# Patient Record
Sex: FEMALE | Race: WHITE | Employment: OTHER | URBAN - METROPOLITAN AREA
[De-identification: names, ages, dates, MRNs, and addresses within clinical notes are randomized per-mention and may not be internally consistent; named-entity substitution may affect disease eponyms.]

---

## 2017-01-01 ENCOUNTER — APPOINTMENT (OUTPATIENT)
Dept: CT IMAGING | Facility: HOSPITAL | Age: 72
DRG: 280 | End: 2017-01-01
Attending: EMERGENCY MEDICINE
Payer: MEDICARE

## 2017-01-01 ENCOUNTER — LAB REQUISITION (OUTPATIENT)
Dept: LAB | Facility: HOSPITAL | Age: 72
End: 2017-01-01
Attending: FAMILY MEDICINE
Payer: MEDICARE

## 2017-01-01 ENCOUNTER — APPOINTMENT (OUTPATIENT)
Dept: GENERAL RADIOLOGY | Facility: HOSPITAL | Age: 72
DRG: 291 | End: 2017-01-01
Attending: EMERGENCY MEDICINE
Payer: MEDICARE

## 2017-01-01 ENCOUNTER — APPOINTMENT (OUTPATIENT)
Dept: CV DIAGNOSTICS | Facility: HOSPITAL | Age: 72
DRG: 280 | End: 2017-01-01
Attending: NURSE PRACTITIONER
Payer: MEDICARE

## 2017-01-01 ENCOUNTER — HOSPITAL ENCOUNTER (INPATIENT)
Facility: HOSPITAL | Age: 72
LOS: 1 days | DRG: 291 | End: 2017-01-01
Attending: EMERGENCY MEDICINE | Admitting: FAMILY MEDICINE
Payer: MEDICARE

## 2017-01-01 ENCOUNTER — PRIOR ORIGINAL RECORDS (OUTPATIENT)
Dept: OTHER | Age: 72
End: 2017-01-01

## 2017-01-01 ENCOUNTER — APPOINTMENT (OUTPATIENT)
Dept: GENERAL RADIOLOGY | Facility: HOSPITAL | Age: 72
DRG: 280 | End: 2017-01-01
Attending: EMERGENCY MEDICINE
Payer: MEDICARE

## 2017-01-01 ENCOUNTER — APPOINTMENT (OUTPATIENT)
Dept: GENERAL RADIOLOGY | Facility: HOSPITAL | Age: 72
DRG: 280 | End: 2017-01-01
Attending: INTERNAL MEDICINE
Payer: MEDICARE

## 2017-01-01 ENCOUNTER — APPOINTMENT (OUTPATIENT)
Dept: GENERAL RADIOLOGY | Facility: HOSPITAL | Age: 72
DRG: 280 | End: 2017-01-01
Attending: NURSE PRACTITIONER
Payer: MEDICARE

## 2017-01-01 ENCOUNTER — APPOINTMENT (OUTPATIENT)
Dept: ULTRASOUND IMAGING | Facility: HOSPITAL | Age: 72
DRG: 280 | End: 2017-01-01
Attending: INTERNAL MEDICINE
Payer: MEDICARE

## 2017-01-01 ENCOUNTER — HOSPITAL ENCOUNTER (INPATIENT)
Facility: HOSPITAL | Age: 72
LOS: 6 days | Discharge: NURSING, REHAB OR LONG-TERM CARE | DRG: 280 | End: 2017-01-01
Attending: EMERGENCY MEDICINE | Admitting: HOSPITALIST
Payer: MEDICARE

## 2017-01-01 VITALS
WEIGHT: 135 LBS | RESPIRATION RATE: 22 BRPM | OXYGEN SATURATION: 100 % | SYSTOLIC BLOOD PRESSURE: 117 MMHG | BODY MASS INDEX: 25.49 KG/M2 | TEMPERATURE: 97 F | DIASTOLIC BLOOD PRESSURE: 75 MMHG | HEIGHT: 61 IN | HEART RATE: 94 BPM

## 2017-01-01 VITALS
HEIGHT: 60 IN | BODY MASS INDEX: 27.14 KG/M2 | WEIGHT: 138.25 LBS | HEART RATE: 100 BPM | SYSTOLIC BLOOD PRESSURE: 102 MMHG | OXYGEN SATURATION: 95 % | DIASTOLIC BLOOD PRESSURE: 60 MMHG | TEMPERATURE: 98 F | RESPIRATION RATE: 20 BRPM

## 2017-01-01 DIAGNOSIS — J18.9 NOSOCOMIAL PNEUMONIA: Primary | ICD-10-CM

## 2017-01-01 DIAGNOSIS — R69 ILLNESS: ICD-10-CM

## 2017-01-01 DIAGNOSIS — E87.2 METABOLIC ACIDOSIS: ICD-10-CM
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
## 2017-01-01 DIAGNOSIS — I50.9 ACUTE ON CHRONIC CONGESTIVE HEART FAILURE, UNSPECIFIED CONGESTIVE HEART FAILURE TYPE: ICD-10-CM

## 2017-01-01 DIAGNOSIS — K92.2 GASTROINTESTINAL HEMORRHAGE, UNSPECIFIED GASTROINTESTINAL HEMORRHAGE TYPE: Primary | ICD-10-CM

## 2017-01-01 DIAGNOSIS — N17.9 ACUTE RENAL FAILURE, UNSPECIFIED ACUTE RENAL FAILURE TYPE (HCC): ICD-10-CM

## 2017-01-01 PROCEDURE — 93307 TTE W/O DOPPLER COMPLETE: CPT | Performed by: NURSE PRACTITIONER

## 2017-01-01 PROCEDURE — 96367 TX/PROPH/DG ADDL SEQ IV INF: CPT

## 2017-01-01 PROCEDURE — 74000 XR ABDOMEN (KUB) (1 AP VIEW)  (CPT=74000): CPT | Performed by: NURSE PRACTITIONER

## 2017-01-01 PROCEDURE — 30233N1 TRANSFUSION OF NONAUTOLOGOUS RED BLOOD CELLS INTO PERIPHERAL VEIN, PERCUTANEOUS APPROACH: ICD-10-PCS | Performed by: HOSPITALIST

## 2017-01-01 PROCEDURE — 99231 SBSQ HOSP IP/OBS SF/LOW 25: CPT | Performed by: HOSPITALIST

## 2017-01-01 PROCEDURE — 87040 BLOOD CULTURE FOR BACTERIA: CPT | Performed by: EMERGENCY MEDICINE

## 2017-01-01 PROCEDURE — 96375 TX/PRO/DX INJ NEW DRUG ADDON: CPT

## 2017-01-01 PROCEDURE — 76770 US EXAM ABDO BACK WALL COMP: CPT | Performed by: INTERNAL MEDICINE

## 2017-01-01 PROCEDURE — 96365 THER/PROPH/DIAG IV INF INIT: CPT

## 2017-01-01 PROCEDURE — 99233 SBSQ HOSP IP/OBS HIGH 50: CPT | Performed by: HOSPITALIST

## 2017-01-01 PROCEDURE — 93010 ELECTROCARDIOGRAM REPORT: CPT

## 2017-01-01 PROCEDURE — 82962 GLUCOSE BLOOD TEST: CPT

## 2017-01-01 PROCEDURE — 99285 EMERGENCY DEPT VISIT HI MDM: CPT

## 2017-01-01 PROCEDURE — 83880 ASSAY OF NATRIURETIC PEPTIDE: CPT | Performed by: EMERGENCY MEDICINE

## 2017-01-01 PROCEDURE — 99232 SBSQ HOSP IP/OBS MODERATE 35: CPT | Performed by: HOSPITALIST

## 2017-01-01 PROCEDURE — 71010 XR CHEST AP PORTABLE  (CPT=71010): CPT | Performed by: EMERGENCY MEDICINE

## 2017-01-01 PROCEDURE — 85025 COMPLETE CBC W/AUTO DIFF WBC: CPT | Performed by: EMERGENCY MEDICINE

## 2017-01-01 PROCEDURE — 36415 COLL VENOUS BLD VENIPUNCTURE: CPT

## 2017-01-01 PROCEDURE — 84484 ASSAY OF TROPONIN QUANT: CPT | Performed by: EMERGENCY MEDICINE

## 2017-01-01 PROCEDURE — 93306 TTE W/DOPPLER COMPLETE: CPT | Performed by: NURSE PRACTITIONER

## 2017-01-01 PROCEDURE — 83605 ASSAY OF LACTIC ACID: CPT | Performed by: EMERGENCY MEDICINE

## 2017-01-01 PROCEDURE — 80053 COMPREHEN METABOLIC PANEL: CPT | Performed by: FAMILY MEDICINE

## 2017-01-01 PROCEDURE — 71010 XR CHEST AP PORTABLE  (CPT=71010): CPT | Performed by: INTERNAL MEDICINE

## 2017-01-01 PROCEDURE — 70450 CT HEAD/BRAIN W/O DYE: CPT | Performed by: EMERGENCY MEDICINE

## 2017-01-01 PROCEDURE — 99223 1ST HOSP IP/OBS HIGH 75: CPT | Performed by: HOSPITALIST

## 2017-01-01 PROCEDURE — 93005 ELECTROCARDIOGRAM TRACING: CPT

## 2017-01-01 PROCEDURE — 80053 COMPREHEN METABOLIC PANEL: CPT | Performed by: EMERGENCY MEDICINE

## 2017-01-01 PROCEDURE — 85025 COMPLETE CBC W/AUTO DIFF WBC: CPT | Performed by: FAMILY MEDICINE

## 2017-01-01 PROCEDURE — 71010 XR CHEST AP PORTABLE  (CPT=71010): CPT | Performed by: NURSE PRACTITIONER

## 2017-01-01 RX ORDER — MULTIVITAMIN WITH FOLIC ACID 400 MCG
1 TABLET ORAL DAILY
COMMUNITY

## 2017-01-01 RX ORDER — METOPROLOL SUCCINATE 25 MG/1
25 TABLET, EXTENDED RELEASE ORAL DAILY
Qty: 30 TABLET | Refills: 11 | Status: SHIPPED | OUTPATIENT
Start: 2017-01-01 | End: 2017-01-01

## 2017-01-01 RX ORDER — SENNA PLUS 8.6 MG/1
2 TABLET ORAL
COMMUNITY

## 2017-01-01 RX ORDER — METOCLOPRAMIDE HYDROCHLORIDE 5 MG/ML
10 INJECTION INTRAMUSCULAR; INTRAVENOUS EVERY 8 HOURS PRN
Status: DISCONTINUED | OUTPATIENT
Start: 2017-01-01 | End: 2017-01-01

## 2017-01-01 RX ORDER — DILTIAZEM HYDROCHLORIDE 5 MG/ML
5 INJECTION INTRAVENOUS EVERY 2 HOUR PRN
Status: DISCONTINUED | OUTPATIENT
Start: 2017-01-01 | End: 2017-01-01

## 2017-01-01 RX ORDER — IPRATROPIUM BROMIDE AND ALBUTEROL SULFATE 2.5; .5 MG/3ML; MG/3ML
3 SOLUTION RESPIRATORY (INHALATION) EVERY 4 HOURS PRN
Status: DISCONTINUED | OUTPATIENT
Start: 2017-01-01 | End: 2017-01-01

## 2017-01-01 RX ORDER — TRAMADOL HYDROCHLORIDE 50 MG/1
50 TABLET ORAL EVERY 8 HOURS PRN
Status: ON HOLD | COMMUNITY
End: 2017-01-01

## 2017-01-01 RX ORDER — ONDANSETRON 2 MG/ML
4 INJECTION INTRAMUSCULAR; INTRAVENOUS EVERY 6 HOURS PRN
Status: DISCONTINUED | OUTPATIENT
Start: 2017-01-01 | End: 2017-01-01

## 2017-01-01 RX ORDER — SODIUM CHLORIDE 9 MG/ML
INJECTION, SOLUTION INTRAVENOUS CONTINUOUS
Status: DISCONTINUED | OUTPATIENT
Start: 2017-01-01 | End: 2017-01-01

## 2017-01-01 RX ORDER — POTASSIUM CHLORIDE 750 MG/1
10 TABLET, EXTENDED RELEASE ORAL DAILY
COMMUNITY
Start: 2017-01-01 | End: 2017-11-21

## 2017-01-01 RX ORDER — PANTOPRAZOLE SODIUM 40 MG/1
40 TABLET, DELAYED RELEASE ORAL
Qty: 60 TABLET | Refills: 0 | Status: SHIPPED | OUTPATIENT
Start: 2017-01-01 | End: 2017-01-01

## 2017-01-01 RX ORDER — ACETAMINOPHEN 500 MG
1000 TABLET ORAL EVERY 8 HOURS PRN
COMMUNITY
End: 2017-01-01

## 2017-01-01 RX ORDER — SODIUM CHLORIDE 450 MG/100ML
INJECTION, SOLUTION INTRAVENOUS CONTINUOUS
Status: DISCONTINUED | OUTPATIENT
Start: 2017-01-01 | End: 2017-01-01

## 2017-01-01 RX ORDER — ALLOPURINOL 100 MG/1
100 TABLET ORAL DAILY
Status: DISCONTINUED | OUTPATIENT
Start: 2017-01-01 | End: 2017-01-01

## 2017-01-01 RX ORDER — NYSTATIN 100000 [USP'U]/G
POWDER TOPICAL 2 TIMES DAILY
COMMUNITY
Start: 2017-01-01 | End: 2017-01-01

## 2017-01-01 RX ORDER — DOCUSATE SODIUM 100 MG/1
100 CAPSULE, LIQUID FILLED ORAL 2 TIMES DAILY
Status: DISCONTINUED | OUTPATIENT
Start: 2017-01-01 | End: 2017-01-01

## 2017-01-01 RX ORDER — ALLOPURINOL 100 MG/1
100 TABLET ORAL DAILY
Status: SHIPPED | COMMUNITY
Start: 2017-01-01

## 2017-01-01 RX ORDER — ASPIRIN 81 MG/1
81 TABLET, CHEWABLE ORAL DAILY
Qty: 30 TABLET | Refills: 0 | Status: SHIPPED | OUTPATIENT
Start: 2017-01-01

## 2017-01-01 RX ORDER — DEXTROSE AND SODIUM CHLORIDE 5; .45 G/100ML; G/100ML
INJECTION, SOLUTION INTRAVENOUS CONTINUOUS
Status: DISCONTINUED | OUTPATIENT
Start: 2017-01-01 | End: 2017-01-01

## 2017-01-01 RX ORDER — POTASSIUM CHLORIDE 750 MG/1
20 CAPSULE, EXTENDED RELEASE ORAL DAILY
COMMUNITY
End: 2017-01-01

## 2017-01-01 RX ORDER — METOCLOPRAMIDE HYDROCHLORIDE 5 MG/ML
5 INJECTION INTRAMUSCULAR; INTRAVENOUS EVERY 8 HOURS PRN
Status: DISCONTINUED | OUTPATIENT
Start: 2017-01-01 | End: 2017-01-01

## 2017-01-01 RX ORDER — TRAMADOL HYDROCHLORIDE 50 MG/1
50 TABLET ORAL EVERY 8 HOURS PRN
Qty: 10 TABLET | Refills: 0 | Status: SHIPPED | OUTPATIENT
Start: 2017-01-01

## 2017-01-01 RX ORDER — SODIUM POLYSTYRENE SULFONATE 15 G/60ML
15 SUSPENSION ORAL; RECTAL ONCE
Status: COMPLETED | OUTPATIENT
Start: 2017-01-01 | End: 2017-01-01

## 2017-01-01 RX ORDER — PANTOPRAZOLE SODIUM 40 MG/1
40 TABLET, DELAYED RELEASE ORAL
Status: ON HOLD | COMMUNITY
End: 2017-01-01

## 2017-01-01 RX ORDER — OMEPRAZOLE 20 MG/1
20 CAPSULE, DELAYED RELEASE ORAL EVERY MORNING
COMMUNITY

## 2017-01-01 RX ORDER — ALLOPURINOL 300 MG/1
300 TABLET ORAL DAILY
Status: ON HOLD | COMMUNITY
End: 2017-01-01

## 2017-01-01 RX ORDER — SODIUM CHLORIDE 9 MG/ML
INJECTION, SOLUTION INTRAVENOUS ONCE
Status: COMPLETED | OUTPATIENT
Start: 2017-01-01 | End: 2017-01-01

## 2017-01-01 RX ORDER — HYDROMORPHONE HYDROCHLORIDE 1 MG/ML
0.2 INJECTION, SOLUTION INTRAMUSCULAR; INTRAVENOUS; SUBCUTANEOUS EVERY 2 HOUR PRN
Status: DISCONTINUED | OUTPATIENT
Start: 2017-01-01 | End: 2017-01-01

## 2017-01-01 RX ORDER — HYDROMORPHONE HYDROCHLORIDE 1 MG/ML
0.8 INJECTION, SOLUTION INTRAMUSCULAR; INTRAVENOUS; SUBCUTANEOUS EVERY 2 HOUR PRN
Status: DISCONTINUED | OUTPATIENT
Start: 2017-01-01 | End: 2017-01-01

## 2017-01-01 RX ORDER — FUROSEMIDE 40 MG/1
40 TABLET ORAL DAILY
COMMUNITY
End: 2017-01-01

## 2017-01-01 RX ORDER — DEXTROSE MONOHYDRATE 25 G/50ML
50 INJECTION, SOLUTION INTRAVENOUS ONCE
Status: COMPLETED | OUTPATIENT
Start: 2017-01-01 | End: 2017-01-01

## 2017-01-01 RX ORDER — PANTOPRAZOLE SODIUM 40 MG/1
40 TABLET, DELAYED RELEASE ORAL
Status: DISCONTINUED | OUTPATIENT
Start: 2017-01-01 | End: 2017-01-01

## 2017-01-01 RX ORDER — ALLOPURINOL 300 MG/1
300 TABLET ORAL DAILY
Status: DISCONTINUED | OUTPATIENT
Start: 2017-01-01 | End: 2017-01-01

## 2017-01-01 RX ORDER — PANTOPRAZOLE SODIUM 40 MG/1
40 TABLET, DELAYED RELEASE ORAL
Qty: 60 TABLET | Refills: 0 | Status: ON HOLD | OUTPATIENT
Start: 2017-01-01 | End: 2017-01-01

## 2017-01-01 RX ORDER — HYDROMORPHONE HYDROCHLORIDE 1 MG/ML
0.4 INJECTION, SOLUTION INTRAMUSCULAR; INTRAVENOUS; SUBCUTANEOUS EVERY 2 HOUR PRN
Status: DISCONTINUED | OUTPATIENT
Start: 2017-01-01 | End: 2017-01-01

## 2017-01-01 RX ORDER — BACLOFEN 10 MG/1
5 TABLET ORAL 3 TIMES DAILY
COMMUNITY
End: 2017-01-01

## 2017-01-01 RX ORDER — FUROSEMIDE 10 MG/ML
40 INJECTION INTRAMUSCULAR; INTRAVENOUS ONCE
Status: COMPLETED | OUTPATIENT
Start: 2017-01-01 | End: 2017-01-01

## 2017-01-01 RX ORDER — TRAMADOL HYDROCHLORIDE 50 MG/1
50 TABLET ORAL EVERY 12 HOURS PRN
Status: DISCONTINUED | OUTPATIENT
Start: 2017-01-01 | End: 2017-01-01

## 2017-01-01 RX ORDER — LEVETIRACETAM 500 MG/1
500 TABLET ORAL 2 TIMES DAILY
Status: DISCONTINUED | OUTPATIENT
Start: 2017-01-01 | End: 2017-01-01

## 2017-01-01 RX ORDER — FUROSEMIDE 40 MG/1
40 TABLET ORAL DAILY
COMMUNITY

## 2017-01-01 RX ORDER — LEVOFLOXACIN 250 MG/1
250 TABLET ORAL DAILY
COMMUNITY

## 2017-01-01 RX ORDER — FUROSEMIDE 10 MG/ML
60 INJECTION INTRAMUSCULAR; INTRAVENOUS ONCE
Status: COMPLETED | OUTPATIENT
Start: 2017-01-01 | End: 2017-01-01

## 2017-01-01 RX ORDER — NYSTATIN 10B UNIT
POWDER (EA) MISCELLANEOUS 2 TIMES DAILY
Status: ON HOLD | COMMUNITY
Start: 2017-01-01 | End: 2017-01-01

## 2017-01-01 RX ORDER — METOPROLOL SUCCINATE 25 MG/1
25 TABLET, EXTENDED RELEASE ORAL
Status: DISCONTINUED | OUTPATIENT
Start: 2017-01-01 | End: 2017-01-01

## 2017-01-01 RX ORDER — ACETAMINOPHEN 325 MG/1
650 TABLET ORAL EVERY 8 HOURS PRN
COMMUNITY

## 2017-01-01 RX ORDER — MAGNESIUM OXIDE 400 MG (241.3 MG MAGNESIUM) TABLET
800 TABLET ONCE
Status: COMPLETED | OUTPATIENT
Start: 2017-01-01 | End: 2017-01-01

## 2017-01-01 RX ORDER — ASPIRIN 81 MG/1
81 TABLET ORAL DAILY
Status: DISCONTINUED | OUTPATIENT
Start: 2017-01-01 | End: 2017-01-01

## 2017-10-14 PROBLEM — K92.2 GASTROINTESTINAL HEMORRHAGE: Status: ACTIVE | Noted: 2017-01-01

## 2017-10-14 PROBLEM — K92.2 GASTROINTESTINAL HEMORRHAGE, UNSPECIFIED GASTROINTESTINAL HEMORRHAGE TYPE: Status: ACTIVE | Noted: 2017-01-01

## 2017-10-14 PROBLEM — E87.2 METABOLIC ACIDOSIS: Status: ACTIVE | Noted: 2017-01-01

## 2017-10-14 PROBLEM — N17.9 ACUTE RENAL FAILURE, UNSPECIFIED ACUTE RENAL FAILURE TYPE (HCC): Status: ACTIVE | Noted: 2017-01-01

## 2017-10-14 NOTE — ED NOTES
XR completed    Pt straight cath completed. Hafsa PCT at bedside to assist.     Family updated on room number and eta to floor.

## 2017-10-14 NOTE — ED INITIAL ASSESSMENT (HPI)
Pt c/o 4 episodes of coffee ground emesis that started today, medics rpt Pt also tachycardic and hypotensive

## 2017-10-14 NOTE — PLAN OF CARE
Pt on 2 liters of oxygen NC with saturation high 90s. Pt receiving IV fluids. Hemoglobin is being monitored but pt doesn't display any active signs of bleeding.

## 2017-10-14 NOTE — ED NOTES
Round on pt. Pt A/Ox2 at this time. Pt does not know year. Family sts this is not pt's norm. Pt able to carry a full conversation. Skin pink. No distress noted. Pt denies pain. 1L 0.9NS completed. Pt to CT with [facility] PCT.

## 2017-10-14 NOTE — ED NOTES
Pt up to floor via cart at this time. Pt on cardiac monitor, pulse ox and 2L O2. Pt remains arouseable to verbal stimuli. A/Ox2. Family accompanying pt to floor. No signs of distress noted at this time.

## 2017-10-14 NOTE — ED PROVIDER NOTES
Patient Seen in: [facility] Emergency Department    History   Patient presents with:  GI Bleeding (gastrointestinal)    Stated Complaint: coffee ground emesis    HPI    49-year-old female brought by medics after patient apparently was found at the n 152.4 cm (5')   Wt 63.5 kg   SpO2 94%   BMI 27.34 kg/m²         Physical Exam    Vital signs reviewed  General appearance: Patient is seems slightly confused disorientated  HEENT: Pupils equal react to light extraocular muscles intact no scleral icterus, m pCO2 24 (*)     Venous O2 Sat.  Calc. 70 (*)     Venous Bicarbonate 10.7 (*)     All other components within normal limits   ABG PANEL W ELECT AND LACTATE - Abnormal; Notable for the following:     ABG pH 7.25 (*)     ABG pCO2 26 (*)     ABG HCO3 10.9 (*) were created for panel order CBC WITH DIFFERENTIAL WITH PLATELET.   Procedure                               Abnormality         Status                     ---------                               -----------         ------                     CBC W/ DIFFERCOLLETTE creatinine elevated and she was hypotensive. We will give 2 L of fluid. Ct Brain Or Head ([id number])    Result Date: 10/14/2017  PROCEDURE:  CT BRAIN OR HEAD ([id number])  COMPARISON:  [facility] BRAIN W/O CONTRAST, 3/14/2008, 10:09.   INDICATIONS:  coffee ground point  ============================================================  ED Course  ------------------------------------------------------------  MDM     As per above      Disposition and Plan     Clinical Impression:  Gastrointestinal hemorrhage, unspecified

## 2017-10-14 NOTE — CONSULTS
[address]  Report of GI Consultation    Leilani Do Patient Status:  Inpatient    3/28/1945 MRN [id number]   [facility] 4SW-A Attending Kendell Small MD   Hosp Day # 0 PCP None Pcp     Date of Admission:  10/14/2017  Date of Co HYDROmorphone HCl PF (DILAUDID) 1 MG/ML injection 0.2 mg 0.2 mg Intravenous Q2H PRN   Or      HYDROmorphone HCl PF (DILAUDID) 1 MG/ML injection 0.4 mg 0.4 mg Intravenous Q2H PRN   Or      HYDROmorphone HCl PF (DILAUDID) 1 MG/ML injection 0.8 mg 0.8 mg In was completed. Pertinent positives and negatives noted in the the HPI. Physical Exam:   Blood pressure 123/96, pulse 135, temperature 98.1 °F (36.7 °C), resp. rate 23, height 152.4 cm (5'), weight 140 lb (63.5 kg), SpO2 94 %.     GENERAL: Laying in bed coffee ground emesis that started today, medics rpt  Pt also tachycardic and hypotensive    FINDINGS: No evidence of intracranial hemorrhage or extra-axial fluid collection.  Lucencies in the deep periventricular white matter are likely sequelae of chronic outweighed by the benefits.     Recommendations:   - IV PPI q12   - Clear liquids   - IVF per ICU/Renal staff   - Trend cardiac enzymes   - Nursing staff instructed to notify me with any recurrent bleeding   - No urgent EGD at this moment, but endoscopic ev

## 2017-10-14 NOTE — CONSULTS
Chavez Morales [address] [facility]  Pulmonary/Critical Care Consult Note  [facility]  Report of Consultation    Zoniaalejandro Liang Patient Status:  Inpatient    3/28/1945 MRN [id number]   [facility] 4SW-A Attending Delaney Vela Medications:  • allopurinol  300 mg Oral Daily   • pantoprazole (PROTONIX) IV push  40 mg Intravenous Q12H     ondansetron HCl, Metoclopramide HCl, HYDROmorphone HCl PF **OR** HYDROmorphone HCl PF **OR** HYDROmorphone HCl PF, influenza virus vaccine PF 91/54 (!) 97.3 °F (36.3 °C) Temporal 118 17 100 % 5' (1.524 m) 140 lb (63.5 kg)         Intake/Output:    Intake/Output Summary (Last 24 hours) at 10/14/17 1830  Last data filed at 10/14/17 1616   Gross per 24 hour   Intake             3000 ml   Output 10/14/17.   Recent Labs   Lab  10/14/17   1612   COLORUR  Yellow   [address] [place] [address] [address]  1.014   GLUUR  Negative   BILUR  Negative   KETUR  Negative   BLOODURINE  Negative   PHURINE  5.0   PROUR  Negative   UROBILINOGEN  <2.0   NITRITE  Negative   L 10/14/2017  CONCLUSION:  No consolidation.     Dictated by: Lynn Oppenheim, MD on 10/14/2017 at 16:03     Approved by: Lynn Oppenheim, MD                ASSESSMENT    · Acute renal injury - unclear etiology, suspect hypovolemia, although differential

## 2017-10-14 NOTE — ED NOTES
Pt back in room from CT. Family at bedside. MD updated on VS. Pt remains A/Ox2. Family sts pt is a full code.

## 2017-10-14 NOTE — ED NOTES
MD at bedside. Updated family, pt will be adm to ICU    RT at bedside for ABG    Ready for XR     Pt had episode of dark emesis.  Sheets and gown changed with Ryan

## 2017-10-14 NOTE — PROGRESS NOTES
GI CONSULT, full note to follow  She is unable to provide complete history, some taken from notes.   Reports nausea for several days, then vomiting today and report of dark, coffee grounds  Denies any abd pain, no diarrhea, no fevers, no jaundice  Labs nota

## 2017-10-14 NOTE — H&P
[facility]  History and Physical     [facility] Patient Status:  Emergency    3/28/1945 MRN [id number]   Location [address] [facility] Attending Lidya Pope MD   Hosp Day # 0 PCP None Pcp     Chief Complaint: Upper GI ble aspirin 81 MG Oral Tab Take 81 mg by mouth daily. Disp:  Rfl:    Multiple Vitamins-Minerals (CENTRUM) Oral Tab Take 1 tablet by mouth daily. Disp:  Rfl:    docusate sodium (COLACE) 100 MG Oral Cap Take 100 mg by mouth 2 (two) times daily.  Disp:  Rfl: 135*   BUN  86*   CREATSERUM  3.08*   CA  8.5   ALB  2.8*   NA  140   K  5.2*   CL  116*   CO2  14.0*   ALKPHO  131   AST  19   ALT  18   BILT  0.3   TP  6.8       Estimated Creatinine Clearance: 11.5 mL/min (based on SCr of 3.08 mg/dL (H)).     Recent Labs

## 2017-10-15 PROBLEM — N17.9 ACUTE RENAL FAILURE (HCC): Status: ACTIVE | Noted: 2017-01-01

## 2017-10-15 NOTE — PROGRESS NOTES
[facility]  Progress Note     Behzad Patel Patient Status:  Inpatient    3/28/1945 MRN [id number]   [facility] 4SW-A Attending Nahed Morales MD   Hosp Day # 1 PCP None Pcp     Chief Complaint: Upper GI Bleed     S: Patient rep 10/15/17   0830   [facility]  2.520*  10.200*  10.300*            Imaging: Imaging data reviewed in Epic.     Medications:   • sodium bicarbonate  50 mEq Intravenous Once   • Sodium Polystyrene Sulfonate  15 g Oral Once   • allopurinol  300 mg Oral Daily   • pant

## 2017-10-15 NOTE — PROGRESS NOTES
[facility] Pulmonary/Critical Care Progress Note     SUBJECTIVE/24H Events:  No acute events overnight.  Patient more alert this morning, complains of back pain which she adds has been present since her stroke \"years\" a 135*  130*  123*   BUN  86*  75*  69*   CREATSERUM  3.08*  2.69*  2.22*   CA  8.5  9.0  9.0   NA  140  143  143   K  5.2*  5.3*  5.7*   CL  116*  119*  120*   CO2  14.0*  14.0*  12.0*     Recent Labs   Lab  10/14/17   1304  10/14/17   1821  10/15/17   0030 started today, medics rpt  Pt also tachycardic and hypotensive    FINDINGS: No evidence of intracranial hemorrhage or extra-axial fluid collection.  Lucencies in the deep periventricular white matter are likely sequelae of chronic small vessel ischemic dise above  · Sinus tachycardia - secondary to above  · H/o ischemic/hemorrhagic CVA; vascular dementia - family report patent at baseline  · H/o CAD, HTN  · H/o DM     PLAN  · Continue IVF  · Monitor urine output and electrolytes  · Serial troponin, cardiology

## 2017-10-15 NOTE — PHYSICAL THERAPY NOTE
PHYSICAL THERAPY EVALUATION - INPATIENT     Room Number: 459/459-A  Evaluation Date: 10/15/2017  Type of Evaluation: Initial  Physician Order: PT Eval and Treat    Presenting Problem: GI hemorrhage  Reason for Therapy: Mobility Dysfunction and Discharg BEARING RESTRICTION  Weight Bearing Restriction: None                PAIN ASSESSMENT  Rating: Unable to rate  Location: \"all over\"  Management Techniques:  Activity promotion;Repositioning    COGNITION  · Overall Cognitive Status:  Impaired  · Arousal/Hannah Need to walk in hospital room?: Total   -   Climbing 3-5 steps with a railing?: Total       AM-PAC Score:  Raw Score: 6   PT Approx Degree of Impairment Score: 100%   Standardized Score (AM-PAC Scale): 23.55   CMS Modifier (G-Code): CN    FUNCTIONAL ABIL level of function. Recommend return to SNF at d/c; need for further at d/c PT TBD once able to complete full mobility assessment and obtain more details on Pt's baseline LOF.    DISCHARGE RECOMMENDATIONS  PT Discharge Recommendations: Skilled nursing facili

## 2017-10-15 NOTE — PROGRESS NOTES
Gastroenterology Progress Note  Patient Name: Eveline Bell  Chief Complaint: coffee ground emesis  S: Mrs Camila Singh is feeling better this AM.  Trops continue to rise, K is up to 5.7 despite aggressive fluid repletion.     O: BP 90/48   Pulse 134   Temp bleeding   - No endoscopy now as there is no sign of ongoing blood loss and risks of anesthesia and procedure in current setting outweigh benefits.   If there is overt sign of bleeding, will re-consider the risk/benefits.   - OK to start clears and advance

## 2017-10-15 NOTE — PROGRESS NOTES
[facility] Pharmacy Note:  Renal Dose Adjustment for Tramadol [place] [facility])    Rocael Linares has been prescribed Tramadol (ULTRAM) 50 mg orally every 8 hours as needed for pain. Estimated Creatinine Clearance: 16 mL/min (based on SCr of 2.22 mg/dL (H)).     Her c

## 2017-10-15 NOTE — CONSULTS
[facility]  Neurology  Consultation Report    Christian Post Patient Status:  Inpatient    3/28/1945 MRN [id number]   [facility] 4SW-A Attending Jefferson Mitchell MD   [facility] Day # 1 PCP None Pcp   Date of Admission:  10/14/2017  Sergey Alcohol use:  No                     Current Medications:    Current Facility-Administered Medications:  allopurinol (ZYLOPRIM) tab 300 mg 300 mg Oral Daily   ondansetron HCl (ZOFRAN) injection 4 mg 4 mg Intravenous Q6H PRN   Metoclopramide HCl (REGLAN) i Oral Tab Take 1 tablet by mouth daily. docusate sodium (COLACE) 100 MG Oral Cap Take 100 mg by mouth 2 (two) times daily. hydrochlorothiazide (HYDRODIURIL) 25 MG Oral Tab Take 25 mg by mouth daily.    levetiracetam (KEPPRA) 500 MG Oral Tab Take 500 mg b normal   Cranial nerves: The voice is  soft. Speech is clear. Facies are symmetric. Facial strength is full. The gaze is conjugate. There is no ptosis. There is no nystagmus or gaze paresis. Motor: There is no abnormal involuntary movement.     Right ischemic disease. Sequelae of left frontal craniotomy is noted.  Volume loss in the left cerebellar hemisphere with dystrophic calcifications in the high convexity left frontal lobe region and chronic minimal extra-axial low density fluid at the craniotomy data. The reader is asked to contact this examiner following review the report question or comments. Thank you for allowing me to participate in the care of your patient.     Damon Estrella  10/15/2017

## 2017-10-15 NOTE — PLAN OF CARE
Pt on room air with oxygen saturations in the high 90s. Pt had one episode of emesis with no evidence of blood, GI was notified and a KUB was done with no significant results. Pt returned to NPO. Cards consulted r/t elevated troponin and tachycardia.  Dr [facility]

## 2017-10-15 NOTE — DIETARY NOTE
[address] ASSESSMENT    Pt is at moderate nutrition risk. Pt does not meet malnutrition criteria.     NUTRITION DIAGNOSIS/PROBLEM:    Inadequate energy intake related to inability to consume sufficient energy as evidenced by NPO s grams protein/day (1-1.2 grams protein per kg)  Fluid: ~1 ml/kcal or per MD discretion    MONITOR AND EVALUATE/NUTRITION GOALS:    1. PO intake to meet at least 75% patient nutrition prescription  3. No signs of skin breakdown  4.  Maintain lean body mass

## 2017-10-15 NOTE — CONSULTS
[facility]    Report of Consultation    Zonia Liang Patient Status:  Inpatient    3/28/1945 MRN [id number]   [facility] 4SW-A Attending Korey Vargas MD   Hosp Day # 1 PCP None Pcp     Date of Admission:  10/14/2017  Date of sodium bicarbonate injection 50 mEq, 50 mEq, Intravenous, Once  •  Sodium Polystyrene Sulfonate (KAYEXALATE) 15 GM/60ML suspension 15 g, 15 g, Oral, Once  •  dextrose 5 %-0.45 % NaCl infusion, , Intravenous, Continuous  •  allopurinol (ZYLOPRIM) tab 300 mg Pale    Laboratories and Data:  Diagnostics:  EKG: as above  Echo: pending  Stress Test:   Cath:   CTA Chest:   CXR: as above    Labs:     Lab Results  Component Value Date   WBC 11.6 10/14/2017   RBC 3.05 10/14/2017   HGB 8.6 10/15/2017   HCT 29.8 10/14/2

## 2017-10-16 NOTE — PROGRESS NOTES
10/16/17 1539   Clinical Encounter Type   Routine Visit ( responded to consult for spiritual care.)   Continue Visiting Yes  (Pt aprreciatees prayer.)   Sacramental Encounters   Communion Patient wants communion    visited with pt and fa

## 2017-10-16 NOTE — PROGRESS NOTES
[facility]  Progress Note     Jaquelin Arevalo Patient Status:  Inpatient    3/28/1945 MRN [id number]   [facility] 4SW-A Attending Deja Carter MD   Hosp Day # 2 PCP None Pcp     Chief Complaint: Anemia, acute renal failure 0.3   --    --    TP  6.8   --   6.5   --    --        Estimated Creatinine Clearance: 20.3 mL/min (based on SCr of 1.75 mg/dL (H)).     Recent Labs   Lab  10/14/17   1304   PTP  15.2*   INR  1.19*       Recent Labs   Lab  10/15/17   0830  10/15/17   1700

## 2017-10-16 NOTE — PROGRESS NOTES
[facility]  Progress Note    Cristina Tanner Patient Status:  Inpatient    3/28/1945 MRN [id number]   [facility] 4SW-A Attending Ben Gabriel MD   Hosp Day # 2 PCP None Pcp     Subjective:  Cristina Tanner is a(n) 74year old fema 10/15/17   1700  10/16/17   0441   RBC  2.75*  3.05*   --    --    --   2.70*   HGB  8.3*  9.1*   < >  8.6*  8.5*  8.2*   HCT  26.6*  29.8*   --    --    --   25.9*   MCV  96.7  97.7   --    --    --   95.9   MCH  30.2  29.8   --    --    --   30.4   MCHC which to my eyes suggest fluid overload    Medications reviewed.       ASSESSMENT  · Acute renal injury -chemistries improved today   · nonanion gap acidosis -improved today with a bicarbonate of 18  · Elevated troponin -evidence of Tackatsubo's cardiomyopa

## 2017-10-16 NOTE — CM/SW NOTE
10/16/17 1300   CM/SW Referral Data   Referral Source Physician   Reason for Referral Discharge planning   Informant Patient; Children   Social History   Recreational Drug/Alcohol Use no   Major Changes Last 6 Months no   Domestic/Partner Violence no   S

## 2017-10-16 NOTE — PLAN OF CARE
CARDIOVASCULAR - ADULT    • Absence of cardiac arrhythmias or at baseline Not Progressing          CARDIOVASCULAR - ADULT    • Maintains optimal cardiac output and hemodynamic stability Progressing        Diabetes/Glucose Control    • Glucose maintained wi

## 2017-10-16 NOTE — PROGRESS NOTES
Jelani Turner is a 74year old female.    Patient presents with:  GI Bleeding (gastrointestinal)    HPI:    Neurology fu note    Pt with recent confusion, word finding trouble  Here for NSTEMI in setting of GIB  Pt appears stable this am  Denies ha or ne

## 2017-10-16 NOTE — PROGRESS NOTES
Called by RN with patient having increased WOB and scattered wheezing after turning for bath. Patient had received cardizem prior to bath for elevated HR, which had improved now elevated again 120s with increased respiratory effort.   Patient does have doc

## 2017-10-16 NOTE — PROGRESS NOTES
Gastroenterology Progress Note  Patient Name: Bright Maynard  Chief Complaint: coffee ground emesis  S: Feels better, still has some epi pain and nausea, no emesis. Tolerating pills with water.  Remains tachycardic  O: BP 95/78   Pulse 141   Temp 99.1 °F endoscopy for now as no signs of bleeding  - If abd pain worsens, would recommend CT abd/pelvis    Gilda Snellen, MD  10:34 AM  10/16/2017  [facility] Gastroenterology  [phone]

## 2017-10-16 NOTE — PROGRESS NOTES
[facility]  Cardiology Progress Note    Jacque Arellano Patient Status:  Inpatient    3/28/1945 MRN [id number]   [facility] 4SW-A Attending Arjun Garcia MD   Hosp Day # 2 PCP None Pcp       Subjective: No chest pain or dyspnea.   No 10/15/17   1200  10/16/17   0441   NA  140  143  143   --   143   K  5.2*  5.3*  5.7*  4.8  4.1   CL  116*  119*  120*   --   117*   CO2  14.0*  14.0*  12.0*   --   18.0*   BUN  86*  75*  69*   --   48*   CREATSERUM  3.08*  2.69*  2.22*   --   1.75*   CA was 10-15%. 2. Mitral valve: There was mild regurgitation. 3. Left atrium: The left atrium was mildly dilated. 4. Tricuspid valve: There was mild-moderate regurgitation.   5. Pulmonary arteries: Systolic pressure was markedly increased, in the range of 6

## 2017-10-17 NOTE — PROGRESS NOTES
[facility]  Progress Note    Sarika Matthews Patient Status:  Inpatient    3/28/1945 MRN [id number]   [facility] 4SW-A Attending Tova Wilde MD   Hosp Day # 3 PCP None Pcp     ASSESSMENT  · Acute renal injury -chemistries impro Summary (Last 24 hours) at 10/17/17 0954  Last data filed at 10/17/17 0900   Gross per 24 hour   Intake           1286.4 ml   Output             1000 ml   Net            286.4 ml     Wt Readings from Last 6 Encounters:  10/16/17 : 138 lb 10.7 oz (62.9 kg) 8.0*       Cultures:    Radiology:      Medications Reviewed:    Current Facility-Administered Medications:  magnesium sulfate 3 g, Potassium Phosphate Dibasic 30 mmol in dextrose 5 % 100 mL ivpb  Intravenous Once   ipratropium-albuterol (DUONEB) nebulizer

## 2017-10-17 NOTE — PROGRESS NOTES
[facility]  Progress Note     Eveline Bell Patient Status:  Inpatient    3/28/1945 MRN [id number]   [facility] 4SW-A Attending Carmen Collado MD   Hosp Day # 3 PCP None Pcp     Chief Complaint: Anemia, acute renal failure --   67*   --    --    --    ALT  18   --   17   --    --    --    BILT  0.3   --   0.3   --    --    --    TP  6.8   --   6.5   --    --    --     < > = values in this interval not displayed.        Estimated Creatinine Clearance: 24.1 mL/min (based on SC

## 2017-10-17 NOTE — PHYSICAL THERAPY NOTE
PHYSICAL THERAPY TREATMENT NOTE - INPATIENT    Room Number: 459/459-A     Session: 1     Number of Visits to Meet Established Goals: 5    Presenting Problem: GI hemorrhage, Takatsubo's cardiomyopathy EF 10-15%. NSTEMI.      History related to current admis Repositioning    BALANCE                                                                                                                     Static Sitting: Poor  Dynamic Sitting: Not tested           Static Standing: Not tested  Dynamic Standing: Not test defined scores per dept policy. Mobility as indicated above. VC's for expectations. PROM to RUE as tolerated, within available range; AAROM to RLE within available range; AROM to LUE and LLE with tactile cues to go thru full range.      Seated in ch including the supine to sit t/f. Goal #3 Compliance with activity recommendations. Goal #4     Goal #5     Goal #6     Goal Comments: Goals established on 10/15/2017    10/17/2017 all goals ongoing.

## 2017-10-17 NOTE — PROGRESS NOTES
Gastroenterology Progress Note  Kathe Hatch Patient Status:  Inpatient    3/28/1945 MRN [id number]   [facility] 4SW-A Attending Sheng Lincoln MD   Hosp Day # 3 PCP None Pcp     Chief Co Problem List:     Gastrointestinal hemorrhage     JULIÁN (acute kidney injury) ([facility] [address]     Toxic metabolic encephalopathy     Gastrointestinal hemorrhage, unspecified gastrointestinal hemorrhage type     Acute renal failure (HCC)     Metabolic acidosis     NSTE

## 2017-10-17 NOTE — PROGRESS NOTES
[facility]  Cardiology Progress Note    Caprice Liang Patient Status:  Inpatient    3/28/1945 MRN [id number]   [facility] 4SW-A Attending Sahil Antunez MD   Hosp Day # 3 PCP None Pcp       Subjective: No chest pain, dyspnea or low Recent Labs   Lab  10/14/17   1304  10/14/17   1821  10/15/17   0427  10/15/17   1200  10/16/17   0441  10/17/17   0442   NA  140  143  143   --   143  142   K  5.2*  5.3*  5.7*  4.8  4.1  3.4*   CL  116*  119*  120*   --   117*  111   CO2  14.0*  14 Intravenous Q2H PRN   Pantoprazole Sodium (PROTONIX) 40 mg in Sodium Chloride 0.9 % 10 mL IV push 40 mg Intravenous Q12H   influenza virus vaccine (FLUAD) ages 72 years and older inj 0.5ml 0.5 mL Intramuscular Prior to discharge     ImaginD echo 10/15

## 2017-10-17 NOTE — PLAN OF CARE
Assumed care of patient at 0700. Alert, oriented to self and place. Pleasant. ST on monitor, hemodynamically stable. Afebrile. Denies pain. Voiding on bedpan. Q2H turning. Tolerating CLD and advanced to Full Liquids. Will continue to monitor.     [place]

## 2017-10-18 NOTE — PROGRESS NOTES
[facility]  Progress Note    Jaquelin Arevalo Patient Status:  Inpatient    3/28/1945 MRN [id number]   [facility] 8NE-A Attending Isidro Castro MD   Hosp Day # 4 PCP None Pcp     ASSESSMENT  · Acute renal injury -chemistries improv Gross per 24 hour   Intake              240 ml   Output               50 ml   Net              190 ml     Wt Readings from Last 6 Encounters:  10/16/17 : 138 lb 10.7 oz (62.9 kg)  07/03/13 : 155 lb (70.3 kg)  04/25/08 : 136 lb (61.7 kg)      Physical Exam: TraMADol HCl (ULTRAM) tab 50 mg 50 mg Oral Q12H PRN   Miconazole Nitrate 2 % powder  Topical Sukumar@[employer]   ondansetron HCl (ZOFRAN) injection 4 mg 4 mg Intravenous Q6H PRN   influenza virus vaccine (FLUAD) ages 72 years and older inj 0.5ml 0.5 mL Intra

## 2017-10-18 NOTE — PROGRESS NOTES
Gastroenterology Progress Note  Jeri Montgomery Patient Status:  Inpatient    3/28/1945 MRN [id number]   [facility] 4SW-A Attending Cecille Cortes MD   Hosp Day # 4 PCP None Pcp     Chief Co esophagitis. Has a h/o 4 cm hiatal hernia found on EGD in 2013 for food impaction  Plan:   1. Continue Protonix 40 mg po BID x8 wks then once a day  2. Advance to soft diet  3. Anti reflux measures  4.  No plans for endoscopy in this setting as risk outweig

## 2017-10-18 NOTE — PROGRESS NOTES
[facility]  Progress Note     Shai Cousin Patient Status:  Inpatient    3/28/1945 MRN [id number]   [facility] 4SW-A Attending Jesusita Knox MD   Hosp Day # 4 PCP None Pcp     Chief Complaint: Anemia, acute renal failure      S: Oral BID   • allopurinol  100 mg Oral Daily   • Miconazole Nitrate   Topical [email or web address]@[email or web address]       ASSESSMENT / PLAN:     1. GI bleed - coffee ground emesis, Hgb stable, remains tachycardic,  Bp better   1. GI following  2. Cont PPI  Po  3.  FLD adv per GI  4

## 2017-10-18 NOTE — PROGRESS NOTES
[facility]  Progress Note     Pam Varela Patient Status:  Inpatient    3/28/1945 MRN [id number]   [facility] 4SW-A Attending Kit MD Yahir   Hosp Day # 4 PCP None Pcp     Chief Complaint: Anemia, acute renal failure Miconazole Nitrate   [email or web address]       ASSESSMENT / PLAN:     1. GI bleed   1. GI following  2. Cont PPI  3. Hg stable  2. Acute blood loss anemia  1. [address] Marika Joe with transfusion for Hgb ~8 with ACS  3. Takatsubo's cardiomyopathy EF 10-15%. NSTEMI.    1.

## 2017-10-18 NOTE — PROGRESS NOTES
[facility] Pharmacy Note:  Renal Dose Adjustment for Metoclopramide (REGLAN)    Jeri Valentina has been prescribed Metoclopramide (REGLAN) 10 mg every 6 hours as needed for nausea/vomiting.     Estimated Creatinine Clearance: 28.8 mL/min (based on SCr of 1.23 mg

## 2017-10-18 NOTE — PLAN OF CARE
Assumed care for pt. After she was transferred from ICU. A&Ox2-3, history of dementia. Denies pain or SOB. Lungs dim on RA. ST on tele with hr in the 130's at rest. SCD's on while in bed. Turned q2h.  Port-a-cath to left upper chest accessed with ANTHONY mohr

## 2017-10-18 NOTE — PROGRESS NOTES
[facility]  Cardiology Progress Note    Rj Carpenter Patient Status:  Inpatient    3/28/1945 MRN [id number]   [facility] 8NE-A Attending Inocente Homans, MD   Hosp Day # 4 PCP None Pcp     Subjective:  Much improved, no vomiting.  Tank Lu apparent distress, chronically ill-looking  Neck:  No JVD  Cardiac:  S1, S2, regular rate tachy  Lungs:   Diminished BS in the bases  Abdomen:  Non-distended, soft, non-tender, BS+. Extremities:   No LE edema.    Neuro:   Alert and oriented x 3; moves all

## 2017-10-19 NOTE — PROGRESS NOTES
[facility]  Progress Note     Anne Marie Sheets Patient Status:  Inpatient    3/28/1945 MRN [id number]   [facility] 4SW-A Attending Alexis Aguila MD   Hosp Day # 5 PCP None Pcp     Chief Complaint: Anemia, acute renal failure Nitrate   Topical [email or web address]       ASSESSMENT / PLAN:     1. GI bleed   1. GI following  2. Cont PPI  3. Hg stable  2. Acute blood loss anemia  1. Rony Mehta with transfusion for Hgb ~8 with ACS  3. Takatsubo's cardiomyopathy EF 10-15%. NSTEMI.    1. Toprol star

## 2017-10-19 NOTE — OCCUPATIONAL THERAPY NOTE
OCCUPATIONAL THERAPY EVALUATION - INPATIENT     Room Number: 4841/6612-V  Evaluation Date: 10/19/2017  Type of Evaluation: Initial  Presenting Problem: Metabolic acidosis, acute renal failure, GI hemorrhage,    Physician Order: IP Consult to Occupational T perform SPT to get in and out of chair from toilet and bed. Patient was dressing UB with set-up, toileting with mod I and SBA to min A for LBD.   Patient able to maneuver manual w/c to get around to make it to happy hour, group activities and going to a [facility] Lot  -   Putting on and taking off regular upper body clothing?: A Lot  -   Taking care of personal grooming such as brushing teeth?: A Little  -   Eating meals?: A Little    AM-PAC Score:  Score: 13  Approx Degree of Impairment: 63.03%  Standardized Score less. Scores of 7 or higher would indicate a need for further evaluation to rule out a dementing disorder, such as Alzheimer’s disease  .  In this OT evaluation patient presents with the following performance deficits: decreased balance, endurance, strength with max assist    Functional Transfer Goals  Patient will transfer from bed to chair:  with mod assist    UE Exercise Program Goal  Patient will be supervision with left AROM HEP (home exercise program).

## 2017-10-19 NOTE — DIETARY NOTE
1230 [facility] ASSESSMENT    Pt is at moderate nutrition risk. Pt does not meet malnutrition criteria.     NUTRITION DIAGNOSIS/PROBLEM:    Inadequate energy intake related to inability to consume sufficient energy as evidenced by NPO lb)  04/25/08 : 61.7 kg (136 lb)      NUTRITION:  Diet: low residue  Oral Supplements: N/A    FOOD/NUTRITION RELATED HISTORY:  Appetite: Fair  Intake: 0%  Intake Meeting Needs: No  Food Allergies: No  Cultural/Ethnic/Gnosticism Preferences Addresses:  Yes

## 2017-10-19 NOTE — PROGRESS NOTES
[facility]  Cardiology Progress Note    Subjective:  No chest pain or shortness of breath.     Objective:  /62 (BP Location: Left arm)   Pulse 112   Temp 98.6 °F (37 °C) (Oral)   Resp 18   Ht 152.4 cm (5')   Wt 138 lb 10.7 oz (62.9 kg)   SpO2 98% Agree with above assessment and plan. Echo today unchanged. Limited in addition of medication and will continue low dose Toprol. If no major events overnight, OK to discharge tomorrow and can follow-up with [facility] or myself as an outpatient in a week.

## 2017-10-19 NOTE — CM/SW NOTE
SW informed pt will be cleared for d/c today and confirmed bed availability with [facility] at [facility]. [facility] updates sent.  Rn to call report to [phone] and ask for 2 [place], pt will return to room 249 bed 2.    MARY completed CTS form and [facility] referral sen

## 2017-10-20 NOTE — PROGRESS NOTES
· [facility] Cardiology Progress Note     Subjective:  No dyspnea or pain.   Reviewed with daughter - plans for Rehab today    Objective:  80/58   a febrile  I/O incomplete   No new labs    Cardiac:S1 S2 regular, tachy  Lungs: clear anterior  Abdome

## 2017-10-20 NOTE — PROGRESS NOTES
[facility]  Progress Note     Jeri Montgomery Patient Status:  Inpatient    3/28/1945 MRN [id number]   [facility] 4SW-A Attending Cecille Cortes MD   Hosp Day # 6 PCP None Pcp     Chief Complaint: Anemia, acute renal failure Daily   • Miconazole Nitrate   [email or web address]       ASSESSMENT / PLAN:     1. GI bleed - no studies done   Resolved   1. GI oked resuming ASA at d/c   2. Cont PPI  BID x8 weeks then weekly  3. F/u as outpt   4. Acute blood loss anemia  2.  Alejandra Prince

## 2017-10-20 NOTE — CM/SW NOTE
SW arranged for 3pm ambulance back to [address] [place] [address] per RN request. Unit RN to call [phone] with report.

## 2017-10-20 NOTE — PROGRESS NOTES
Report called to nurse Fuentes at [facility]. IV and tele removed. Port-a-cath Hep locked and de-accessed. Transition of care report and discharge instructions will be sent with the patient. Will be transported via ambulance.

## 2017-10-20 NOTE — CM/SW NOTE
10/20/17 1600   Discharge disposition   Discharged to: Skilled Nurs   Name of [id number] [address]   Discharge transportation [employer]

## 2017-10-21 NOTE — DISCHARGE SUMMARY
[facility]  DISCHARGE SUMMARY     Nina Yin Patient Status:  Inpatient    3/28/1945 MRN [id number]   [facility] 8NE-A Attending No att. providers found   Hosp Day # 6 PCP None Pcp     Date of Admission: 10/14/2017  Date of Fox Memory symptoms other than pain \"all over\". Family does report some weight loss and decrease PO intake over the past 8-9 months.      Consult GI, cardiology, critical care pulmonology, neurology  Brief Synopsis:   Patient resides at [facility] was brought to [facility] daughter patient will be returning to [facility] where she resides for the last 9-1/2 years. Will need follow-up as outlined above.     Procedures during hospitalization:   • No procedures    Incidental or significant findings and recommendations (brief d hours as needed for Pain.    Quantity:  10 tablet  Refills:  0        CONTINUE taking these medications      Instructions Prescription details   clotrimazole-betamethasone 1-0.05 % Crea  Commonly known as:  LOTRISONE      Apply  topically 2 (two) times augusto Lisset Dillard, [address]  [address] [id number]      Gastroenterology, follow up in 3-4 weeks after discharge    PCP at [facility], [address] [place] [address], [address] [place] [address] [phone]

## 2017-11-15 PROBLEM — I50.9 ACUTE ON CHRONIC CONGESTIVE HEART FAILURE, UNSPECIFIED CONGESTIVE HEART FAILURE TYPE: Status: ACTIVE | Noted: 2017-01-01

## 2017-11-15 PROBLEM — R73.9 HYPERGLYCEMIA: Status: ACTIVE | Noted: 2017-01-01

## 2017-11-15 PROBLEM — R79.89 AZOTEMIA: Status: ACTIVE | Noted: 2017-01-01
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
## 2017-11-15 PROBLEM — D64.9 ANEMIA: Status: ACTIVE | Noted: 2017-01-01

## 2017-11-15 PROBLEM — J18.9 NOSOCOMIAL PNEUMONIA: Status: ACTIVE | Noted: 2017-01-01

## 2017-11-15 PROBLEM — N17.9 ACUTE KIDNEY INJURY (HCC): Status: ACTIVE | Noted: 2017-01-01

## 2017-11-15 NOTE — ED NOTES
Pt having brown liquid appear on lips. Had pt take out dentures and swish and spit. Dentures cleansed and replaced. No further liquid appearing.

## 2017-11-15 NOTE — ED PROVIDER NOTES
Patient Seen in: [facility] Emergency Department    History   Patient presents with:  Nausea/Vomiting/Diarrhea (gastrointestinal)    Stated Complaint:     HPI    56-year-old with a history of coronary artery disease, chronic kidney disease, COPD, di (!) 97 °F (36.1 °C)  Temp src: Temporal  SpO2: 98 %  O2 Device: None (Room air)    Current:/75   Pulse 93   Temp (!) 97 °F (36.1 °C) (Temporal)   Resp 17   Ht 154.9 cm (5' 1\")   Wt 61.2 kg   SpO2 99%   BMI 25.51 kg/m²         Physical Exam    Genera Abnormal            Final result                 Please view results for these tests on the individual orders.    LACTIC ACID, PLASMA   LACTIC ACID, PLASMA   LACTIC ACID, PLASMA   RAINBOW DRAW BLUE   RAINBOW DRAW LAVENDER   RAINBOW DRAW LIGHT GREEN   [facility] from the hospital therefore she was also given Lasix. She had no episodes of vomiting here in the emergency department. Talk with her primary care physician Dr. Chidi Vicente who saw her in the ER. I spoke with Dr. Sonny Goldman from cardiology as well.   Res

## 2017-11-15 NOTE — ED INITIAL ASSESSMENT (HPI)
Pt w/ nausea and vomiting this am along w/ poor appetite. Hx of pneum the past 4 days and treated w/ Levaquin. Pt had MI last month.  Pt had a strawberry shake this am.

## 2017-11-16 NOTE — PLAN OF CARE
Patient admitted to unit at 1724, was moved to bed, cleaned and placed on bedpan and then taken off; patient was alert and oriented, let this writer know that she had had a stroke and could not move her right side.  While trying to obtain temperature, todd

## 2017-11-16 NOTE — PROGRESS NOTES
11/15/17 1841   Clinical Encounter Type   Visited With Family   Continue Visiting No   Crisis Visit Death   Referral From Family   Referral To    Buddhism Encounters   Buddhism Needs Prayer   Patient Spiritual Encounters   Spiritual Assessment

## 2017-11-23 NOTE — DISCHARGE SUMMARY
[facility]  Discharge Summary    Rocael Linares Patient Status:  Inpatient    3/28/1945 MRN [id number]   [facility] 2NE-A Attending No att. providers found   Hosp Day # 1 PCP None Pcp     Date of Admission:  11/15/2017  Date of Expir

## 2017-12-05 LAB
BUN: 30 MG/DL
CALCIUM: 8.9 MG/DL
CHLORIDE: 109 MEQ/L
CREATININE, SERUM: 1.3 MG/DL
GLUCOSE: 124 MG/DL
HEMATOCRIT: 28.3 %
HEMOGLOBIN: 9.2 G/DL
MAGNESIUM: 2.1 MG/DL
PLATELETS: 206 K/UL
POTASSIUM, SERUM: 4.2 MEQ/L
PROBNP: NORMAL PG/ML
RED BLOOD COUNT: 3.03 X 10-6/U
SODIUM: 140 MEQ/L
WHITE BLOOD COUNT: 11 X 10-3/U

## 2020-09-30 NOTE — H&P
59 Sarah  Patient Status:  Inpatient    3/28/1945 MRN [id number]   [facility] 2NE-A Attending Fede Young MD   Hosp Day # 0 PCP None Pcp     Date:  11/15/2017  Date of Admission:  11/15/201 11/15/2017 at 0800   sennosides 8.6 MG Oral Tab Take 2 tablets by mouth every third day as needed for constipation. Disp:  Rfl:  11/14/2017 at 0800   acetaminophen 325 MG Oral Tab Take 650 mg by mouth every 8 (eight) hours as needed for Pain.  Disp:  Rfl: 11/15/2017   ALB 3.0 11/15/2017   ALKPHO 214 11/15/2017   BILT 1.2 11/15/2017   TP 6.8 11/15/2017   AST 64 11/15/2017   ALT 54 11/15/2017   TROP 0.134 11/15/2017       Imaging:  X-Ray and Ultrasound    Assessment:  Patient Active Problem List:     Lynn Olson Induction of labor-AROM

## (undated) NOTE — IP AVS SNAPSHOT
Patient Demographics     Address  [address] [facility] [address] Phone  [phone] [facility])      Emergency Contact(s)     Name Relation Home Work [address] [facility] Daughter   [phone]    [facility]   [phone]      Allergies as of Next dose due:  10/20 6PM      Take 500 mg by mouth 2 (two) times daily. loratadine 10 MG Tabs  Commonly known as:  CLARITIN  Next dose due:  10/21 9AM      Take 10 mg by mouth daily.           Metoprolol Succinate ER 25 MG Tb24  Commonly known as: [id number] Pantoprazole Sodium (PROTONIX) EC tab 40 mg 10/19/17 1803 Given      [id number] Pantoprazole Sodium (PROTONIX) EC tab 40 mg 10/20/17 0536 Given      [id number] allopurinol (ZYLOPRIM) tab 100 mg 10/20/17 0907 Given      [id number] aspirin EC tab 81 :  Frandy Tijerina MD (Physician)    Related Notes:  Original Note by Frandy Tijerina MD (Physician) filed at 10/14/2017  3:56 PM         [facility]  History and Physical     Nina [facility] Patient Status:  Emergency    3/28/1945 MRN clotrimazole-betamethasone (LOTRISONE) 1-0.05 % Apply Externally Cream Apply  topically 2 (two) times daily. Disp:  Rfl:    Loperamide HCl (IMODIUM A-D OR) Take  by mouth. Disp:  Rfl:    aspirin 81 MG Oral Tab Take 81 mg by mouth daily.  Disp:  Rfl:    Omayra Integument: No rashes or lesions. Psychiatric:[MM.1] tired[MM. 2]       Diagnostic Data:      Labs:  Recent Labs   Lab  10/14/17   1304   WBC  6.9   HGB  8.3*   MCV  96.7   PLT  233.0   INR  1.19*       Recent Labs   Lab  10/14/17   1304   GLU  135*   BUN H&P signed by Jurgen Foster MD at 10/14/2017  3:56 PM  Version 2 of 3    Author:  Jurgen Foster MD Service:  (none) Author Type:  Physician    Filed:  10/14/2017  3:56 PM Date of Service:  10/14/2017  2:58 PM Status:  Addendum    :  Matt Bush, Family History: No family history on file. Allergies:   Morphine                    Medications:    No current facility-administered medications on file prior to encounter.    Current Outpatient Prescriptions on File Prior to Encounter:  TraMADol HCl 50 gallops. Equal pulses. Chest and Back: No tenderness or deformity. Abdomen: Soft, nontender, nondistended. Positive bowel sounds. No rebound, guarding or organomegaly. Neurologic: No focal neurological deficits.    Musculoskeletal: Moves all extremitie Attribution Metzger    MM. 1 - Nina Beck MD on 10/14/2017  2:58 PM  MM. 2 - Nina Beck MD on 10/14/2017  3:43 PM  MM. 3 - Nina Beck MD on 10/14/2017  3:53 PM  MM. 4 - Nina Beck MD on 10/14/2017  3:02 PM               H&P signed by Mariza Pike Social History:  reports that she has never smoked. She has never used smokeless tobacco.    Family History: No family history on file.     Allergies:   Morphine                    Medications:    No current facility-administered medications on file prior t Respiratory: Clear to auscultation bilaterally. No wheezes. No rhonchi. Cardiovascular: S1, S2. Regular rate and rhythm. No murmurs, rubs or gallops. Equal pulses. Chest and Back: No tenderness or deformity. Abdomen: Soft, nontender, nondistended.   Pos MM.1 - Dallas Bone MD on 10/14/2017  2:58 PM  MM. 2 - Dallas Bone MD on 10/14/2017  3:43 PM  MM. 3 - Dallas Bone MD on 10/14/2017  3:02 PM                        Consults - MD Consult Notes      Consults signed by Bg Kwan MD at 10/15 • CORONARY ARTERY DISEASE    • Diabetes [facility])    • Dysphagia    • Esophageal reflux    • Gout    • Heart failure (HCC)    • Osteoporosis    • RA (rheumatoid arthritis) ([facility] [address]    • Stroke [facility])    • Unspecified essential hypertension      Past Surgical Histo 10/15/17 : 136 lb 7.4 oz (61.9 kg)  07/03/13 : 155 lb (70.3 kg)  04/25/08 : 136 lb (61.7 kg)      Telemetry: sinus tachycardia  General: Alert and in no apparent distress. Frail appearing elderly woman  HEENT: No focal deficits.   Neck: No JVD  Cardiac: Reg life-threatening recurrent bleeding. Her tachycardia is obviously physiologic -- I do not think any suppressive therapy will be effective at the moment, especially with her associated hypotension. Reviewed with her son Marlys Victoria at the bedside.         Will Principal Problem:    Gastrointestinal hemorrhage, unspecified gastrointestinal hemorrhage type  Active Problems:    Gastrointestinal hemorrhage    Acute renal failure (HCC)    Metabolic acidosis    NSTEMI (non-ST elevated myocardial infarction) ([facility] [address] No c/o numbness or tingling    Communication: WFL    Behavioral/Emotional/Social: Pleasant and cooperative     RANGE OF MOTION AND STRENGTH ASSESSMENT  Upper extremity ROM is within functional limits except:  Right UE: no active or purposeful act observed initially sitting with left lean and once postural control techniques applied, pt with improved midline positioning.   Patient instructed in LUE active ex: shoulder flexion, shoulder abd, elbow flex/ext, supination/pronation, wrist flex/ext, hand pumps: 5 r function. Patient will benefit from continued skilled OT intervention at SNF.     Patient Complexity  Occupational Profile/Medical History MODERATE - Expanded review of history including review of medical or therapy record   Specific performance deficits i Attempted to see pt for eval, but pt eating at this time. Will try again later as time permits. [MC.1]      Attribution Key    MC.1 - Priti Singleton, OT on 10/18/2017  1:08 PM                     Video Swallow Study Notes     No notes of this type exist f

## (undated) NOTE — IP AVS SNAPSHOT
[address]            (For Outpatient Use Only) Initial Admit Date: 10/14/2017   Inpt/Obs Admit Date: Inpt: 10/14/17 / Obs: N/A   Discharge Date:    Garrett Melara:  [de-identified]   MRN: [de-identified]   CSN: [id number]        FRANCIA HAMILTONPZ Hospital Account Financial Class: Medicare    October 20, 2017